# Patient Record
Sex: FEMALE | Race: BLACK OR AFRICAN AMERICAN
[De-identification: names, ages, dates, MRNs, and addresses within clinical notes are randomized per-mention and may not be internally consistent; named-entity substitution may affect disease eponyms.]

---

## 2021-04-26 ENCOUNTER — DASHBOARD ENCOUNTERS (OUTPATIENT)
Age: 31
End: 2021-04-26

## 2021-04-26 ENCOUNTER — WEB ENCOUNTER (OUTPATIENT)
Dept: URBAN - METROPOLITAN AREA CLINIC 40 | Facility: CLINIC | Age: 31
End: 2021-04-26

## 2021-04-26 ENCOUNTER — LAB OUTSIDE AN ENCOUNTER (OUTPATIENT)
Dept: URBAN - METROPOLITAN AREA CLINIC 40 | Facility: CLINIC | Age: 31
End: 2021-04-26

## 2021-04-26 ENCOUNTER — OFFICE VISIT (OUTPATIENT)
Dept: URBAN - METROPOLITAN AREA CLINIC 40 | Facility: CLINIC | Age: 31
End: 2021-04-26
Payer: COMMERCIAL

## 2021-04-26 DIAGNOSIS — R63.4 WEIGHT LOSS: ICD-10-CM

## 2021-04-26 DIAGNOSIS — R10.84 ABDOMINAL PAIN, GENERALIZED: ICD-10-CM

## 2021-04-26 DIAGNOSIS — R10.9 ABDOMINAL WALL PAIN IN RIGHT FLANK: ICD-10-CM

## 2021-04-26 PROCEDURE — 99204 OFFICE O/P NEW MOD 45 MIN: CPT | Performed by: INTERNAL MEDICINE

## 2021-04-26 NOTE — HPI-TODAY'S VISIT:
Patient presents for evaluation of abdominal pain.  This is been off and on for some time but really intense for the last month.  Progressive. She describes gripping stabbing dull achy moderate to severe pain almost exclusively after eating food.  A classic symptom would be eating food and then 5 minutes later noticing a gradual abdominal pain sometimes doubling her over or even sending her to the floor and pain.  It last for minutes to hours at a time and then lets up.  Interestingly, she has regular bowel movements, she denies rectal bleeding or diarrhea or constipation.  There is no nausea or vomiting.  She does describe right-sided abdominal pain but also more of the diffuse pain and specifically lower abdominal pain when it occurs.  She denies ulcer type symptoms, symptoms generally do not happen on an empty stomach. Not taking excessive NSAIDs or Goody packets.  No excessive alcohol use on a daily basis.  Otherwise no other aggravating or alleviating factors for her pain.  She is avoiding food because it causes pain.  She has lost some weight.  This is concerning to her and coworkers and family members. She tried Carafate briefly without response.  Otherwise no acid reflux or nausea or vomiting or other red flag symptoms.

## 2021-04-29 ENCOUNTER — OFFICE VISIT (OUTPATIENT)
Dept: URBAN - METROPOLITAN AREA CLINIC 16 | Facility: CLINIC | Age: 31
End: 2021-04-29
Payer: COMMERCIAL

## 2021-04-29 DIAGNOSIS — N20.0 KIDNEY STONE: ICD-10-CM

## 2021-04-29 DIAGNOSIS — R10.84 ABDOMINAL PAIN, GENERALIZED: ICD-10-CM

## 2021-04-29 DIAGNOSIS — K76.89 ABNORMAL LIVER FUNCTION: ICD-10-CM

## 2021-04-29 DIAGNOSIS — D18.09 HEMANGIOMA OF OTHER SITES: ICD-10-CM

## 2021-04-29 PROCEDURE — 76705 ECHO EXAM OF ABDOMEN: CPT | Performed by: INTERNAL MEDICINE
